# Patient Record
Sex: MALE | URBAN - METROPOLITAN AREA
[De-identification: names, ages, dates, MRNs, and addresses within clinical notes are randomized per-mention and may not be internally consistent; named-entity substitution may affect disease eponyms.]

---

## 2021-02-16 ENCOUNTER — OFFICE VISIT (OUTPATIENT)
Dept: FAMILY MEDICINE CLINIC | Facility: CLINIC | Age: 56
End: 2021-02-16

## 2021-02-16 DIAGNOSIS — Z02.9 ADMINISTRATIVE ENCOUNTER: Primary | ICD-10-CM

## 2021-02-16 NOTE — PROGRESS NOTES
Spoke with pt on the phone for triage at [facility]. He is a 50year old male who presents for excessive thirst and excessive urination after being started on metformin about 2 weeks ago.  He denies any rash/hives, itching, swelling, cough, wheezing, difficulty